# Patient Record
Sex: FEMALE | Race: WHITE
[De-identification: names, ages, dates, MRNs, and addresses within clinical notes are randomized per-mention and may not be internally consistent; named-entity substitution may affect disease eponyms.]

---

## 2017-08-29 NOTE — RO
DATE OF PROCEDURE:  08/29/2017

 

PREPROCEDURE DIAGNOSIS:   Postmenopausal bleeding and abnormal ultrasound.  The

patient also has a incidental procidentia.

 

POSTPROCEDURE DIAGNOSIS:  Postmenopausal bleeding and abnormal ultrasound.  The

patient also has a incidental procidentia. Small polyps.

 

PROCEDURE:  Dilation and curettage, hysteroscopy and MyoSure polypectomy.

 

SURGEON:  Dr. Luciana Yanez.

 

ASSISTANT:

 

ANESTHESIA:  LMA

 

DESCRIPTION OF PROCEDURE:  Marta was brought to the operating room where

sufficient LMA anesthesia was induced and she was prepped, draped and positioned

in the usual sterile fashion.  The bladder was emptied. The cervix grasped with

single tooth tenacula and the cervix was sounded.  She only sounded to 4, which

most likely in this 84-year-old patient represents atrophic changes but palpation

of the procidentia indicates the cervix may be a little bit elongated so we

dilated her at 4 cm dilation and then went ahead and placed the scope and saw

what appeared to be endometrial polyps and atrophy. So these were resected.  At

the fundus, there was a small area that appeared to be just a single tubal ostia

or a small beginnings of a perforation from the sounding.  We did not see a

second ostia.  We went ahead and dilated her a little further so we could examine

this area and there was no evidence of any significant problem there.  There was

certainly no evidence that we had not gotten all the way into the uterus.  We

dilated enough that we could see that this was the beginnings of as small

perforation but there was no significant bleeding despite her Eliquis until we

backed out the scope to the area where were resected the polyp.  We did not see

any bleeding in that area of dilation at all.  So there was no further lesion.

The curettage had already been accomplished before we even went ahead and dilated

a little further.  So we completed the procedure.  Again, there was good

hemostasis.

 

Estimated blood loss for the procedure was maybe 5 mL.

 

Fluid replacement was crystalloid.

 

Complications:  None.

 

CONDITION AND DISPOSITION:  Marta tolerated the procedure well and was

recovering in the recovery room in good condition.

## 2017-09-08 NOTE — REPUSA
CT of the abdomen and pelvis without contrast

Clinical statement: Pain.

Technique: Multiple axial CT images were obtained from the base of the lungs to the floor of the pelv
is utilizing 5 mm axial slices without administration of contrast. Coronal and sagittal reconstructio
ns were also obtained.

No comparison is available.

Findings:

Chest: The visualized lung bases are clear. There is a moderate sized hiatal hernia.

Abdomen: The kidneys are normal in size bilaterally. There is no evidence of hydronephrosis or nephro
lithiasis. The liver, spleen, pancreas, gallbladder and adrenal glands are unremarkable. The aorta de
monstrates moderate atherosclerosis, with normal caliber and contour. There is no abdominal lymphaden
opathy or ascites.

Pelvis: The bowel is unremarkable, with no obstructive or inflammatory changes. The appendix is ramona
l. The urinary bladder is within normal limits. There is no pelvic lymphadenopathy or ascites. The ot
her pelvic structures appear unremarkable.

Bones: There are no suspicious osseous abnormalities seen. Levoscoliosis of the spine is appreciated,
 with mild spondylosis. Right hip arthroplasty is intact.

Impression:

1. No obstructive or inflammatory bowel changes.

2. Moderate sized hiatal hernia.

3. No evidence of hydronephrosis or nephrolithiasis.

4. Scoliosis with mild spondylosis.

     Electronically signed by EMIYL CABALLERO MD on 09/08/2017 11:30:38 PM ET

## 2019-06-20 NOTE — REPVR
EXAM: 

 CT Head Without Contrast 



EXAM DATE/TIME: 

 6/20/2019 6:21 AM 



CLINICAL HISTORY: 

 86 years old, female; Pain; Headache not specified; Additional info: R 

headache, h/o a-fib 



TECHNIQUE: 

 Imaging protocol: Axial computed tomography images of the head without 

contrast. 

 Radiation optimization: All CT scans at this facility use at least one of 

these dose optimization techniques: automated exposure control; mA and/or kV 

adjustment per patient size (includes targeted exams where dose is matched to 

clinical indication); or iterative reconstruction. 



COMPARISON: 

 CT Head without contrast 5/12/2015 7:46 AM 



FINDINGS: 

 Brain: There is no acute intracranial abnormality. Mild prominence of 

ventricles and sulci representing volume loss. Mild small vessel ischemic 

changes are seen. There is no mass, midline shift, or mass effect. Gray-white 

matter differentiation is preserved. There is no evidence of hemorrhage. There 

is no extra-axial fluid collection. Basal cisterns are patent. 

 Ventricles: See Brain Finding. 

 Bones/joints: The visualized osseous structures are unremarkable. 

 Sinuses: Visualized sinuses are clear. 

 Mastoid air cells: Mastoid air cells are clear. 

 Soft tissues: Unremarkable. 



IMPRESSION: 

1. Mild volume loss and small vessel ischemic changes. . 

2. No acute intracranial abnormality. 



Electronically signed by: Ene Viveros On 06/20/2019  07:56:46 AM

## 2021-07-06 ENCOUNTER — HOSPITAL ENCOUNTER (OUTPATIENT)
Dept: HOSPITAL 53 - M WUC | Age: 86
End: 2021-07-06
Attending: NURSE PRACTITIONER
Payer: MEDICARE

## 2021-07-06 DIAGNOSIS — I10: Primary | ICD-10-CM

## 2021-07-06 LAB
ALBUMIN SERPL BCG-MCNC: 3.8 GM/DL (ref 3.2–5.2)
ALT SERPL W P-5'-P-CCNC: 20 U/L (ref 12–78)
BASOPHILS # BLD AUTO: 0 10^3/UL (ref 0–0.2)
BASOPHILS NFR BLD AUTO: 0.6 % (ref 0–1)
BILIRUB SERPL-MCNC: 0.4 MG/DL (ref 0.2–1)
BUN SERPL-MCNC: 23 MG/DL (ref 7–18)
CALCIUM SERPL-MCNC: 9.2 MG/DL (ref 8.8–10.2)
CHLORIDE SERPL-SCNC: 108 MEQ/L (ref 98–107)
CHOLEST SERPL-MCNC: 168 MG/DL (ref ?–200)
CHOLEST/HDLC SERPL: 2.95 {RATIO} (ref ?–5)
CO2 SERPL-SCNC: 28 MEQ/L (ref 21–32)
CREAT SERPL-MCNC: 1.33 MG/DL (ref 0.55–1.3)
EOSINOPHIL # BLD AUTO: 0.2 10^3/UL (ref 0–0.5)
EOSINOPHIL NFR BLD AUTO: 2.3 % (ref 0–3)
GFR SERPL CREATININE-BSD FRML MDRD: 40.1 ML/MIN/{1.73_M2} (ref 32–?)
GLUCOSE SERPL-MCNC: 75 MG/DL (ref 70–100)
HCT VFR BLD AUTO: 36 % (ref 36–47)
HDLC SERPL-MCNC: 57 MG/DL (ref 40–?)
HGB BLD-MCNC: 11.6 G/DL (ref 12–15.5)
LDLC SERPL CALC-MCNC: 76 MG/DL (ref ?–100)
LYMPHOCYTES # BLD AUTO: 2.9 10^3/UL (ref 1.5–5)
LYMPHOCYTES NFR BLD AUTO: 44.8 % (ref 24–44)
MCH RBC QN AUTO: 29.4 PG (ref 27–33)
MCHC RBC AUTO-ENTMCNC: 32.2 G/DL (ref 32–36.5)
MCV RBC AUTO: 91.4 FL (ref 80–96)
MONOCYTES # BLD AUTO: 0.6 10^3/UL (ref 0–0.8)
MONOCYTES NFR BLD AUTO: 8.9 % (ref 2–8)
NEUTROPHILS # BLD AUTO: 2.8 10^3/UL (ref 1.5–8.5)
NEUTROPHILS NFR BLD AUTO: 43.2 % (ref 36–66)
NONHDLC SERPL-MCNC: 111 MG/DL
PLATELET # BLD AUTO: 194 10^3/UL (ref 150–450)
POTASSIUM SERPL-SCNC: 4.6 MEQ/L (ref 3.5–5.1)
PROT SERPL-MCNC: 7.6 GM/DL (ref 6.4–8.2)
RBC # BLD AUTO: 3.94 10^6/UL (ref 4–5.4)
SODIUM SERPL-SCNC: 142 MEQ/L (ref 136–145)
T4 FREE SERPL-MCNC: 1.14 NG/DL (ref 0.76–1.46)
TRIGL SERPL-MCNC: 176 MG/DL (ref ?–150)
TSH SERPL DL<=0.005 MIU/L-ACNC: 1.43 UIU/ML (ref 0.36–3.74)
WBC # BLD AUTO: 6.5 10^3/UL (ref 4–10)

## 2022-07-20 ENCOUNTER — HOSPITAL ENCOUNTER (OUTPATIENT)
Dept: HOSPITAL 53 - M WUC | Age: 87
End: 2022-07-20
Attending: NURSE PRACTITIONER
Payer: MEDICARE

## 2022-07-20 DIAGNOSIS — I10: Primary | ICD-10-CM

## 2022-07-20 LAB
ALBUMIN SERPL BCG-MCNC: 3.8 GM/DL (ref 3.2–5.2)
ALT SERPL W P-5'-P-CCNC: 23 U/L (ref 12–78)
BASOPHILS # BLD AUTO: 0.1 10^3/UL (ref 0–0.2)
BASOPHILS NFR BLD AUTO: 1 % (ref 0–1)
BILIRUB SERPL-MCNC: 0.6 MG/DL (ref 0.2–1)
BUN SERPL-MCNC: 27 MG/DL (ref 7–18)
CALCIUM SERPL-MCNC: 9.2 MG/DL (ref 8.8–10.2)
CHLORIDE SERPL-SCNC: 108 MEQ/L (ref 98–107)
CO2 SERPL-SCNC: 29 MEQ/L (ref 21–32)
CREAT SERPL-MCNC: 1.52 MG/DL (ref 0.55–1.3)
EOSINOPHIL # BLD AUTO: 0.2 10^3/UL (ref 0–0.5)
EOSINOPHIL NFR BLD AUTO: 2.3 % (ref 0–3)
GFR SERPL CREATININE-BSD FRML MDRD: 34.3 ML/MIN/{1.73_M2} (ref 32–?)
GLUCOSE SERPL-MCNC: 98 MG/DL (ref 70–100)
HCT VFR BLD AUTO: 33.2 % (ref 36–47)
HGB BLD-MCNC: 10.6 G/DL (ref 12–15.5)
LYMPHOCYTES # BLD AUTO: 2.8 10^3/UL (ref 1.5–5)
LYMPHOCYTES NFR BLD AUTO: 38.8 % (ref 24–44)
MCH RBC QN AUTO: 29.1 PG (ref 27–33)
MCHC RBC AUTO-ENTMCNC: 31.9 G/DL (ref 32–36.5)
MCV RBC AUTO: 91.2 FL (ref 80–96)
MONOCYTES # BLD AUTO: 0.6 10^3/UL (ref 0–0.8)
MONOCYTES NFR BLD AUTO: 7.6 % (ref 2–8)
NEUTROPHILS # BLD AUTO: 3.6 10^3/UL (ref 1.5–8.5)
NEUTROPHILS NFR BLD AUTO: 50 % (ref 36–66)
PLATELET # BLD AUTO: 192 10^3/UL (ref 150–450)
POTASSIUM SERPL-SCNC: 4.4 MEQ/L (ref 3.5–5.1)
PROT SERPL-MCNC: 7.3 GM/DL (ref 6.4–8.2)
RBC # BLD AUTO: 3.64 10^6/UL (ref 4–5.4)
SODIUM SERPL-SCNC: 140 MEQ/L (ref 136–145)
WBC # BLD AUTO: 7.3 10^3/UL (ref 4–10)